# Patient Record
Sex: FEMALE | Race: WHITE | Employment: UNEMPLOYED | URBAN - METROPOLITAN AREA
[De-identification: names, ages, dates, MRNs, and addresses within clinical notes are randomized per-mention and may not be internally consistent; named-entity substitution may affect disease eponyms.]

---

## 2023-12-26 ENCOUNTER — HOSPITAL ENCOUNTER (OUTPATIENT)
Age: 33
Discharge: HOME | End: 2023-12-26
Payer: COMMERCIAL

## 2023-12-26 ENCOUNTER — HOSPITAL ENCOUNTER (OUTPATIENT)
Age: 33
Setting detail: SPECIMEN
Discharge: HOME | End: 2023-12-26

## 2023-12-26 ENCOUNTER — OFFICE VISIT (OUTPATIENT)
Dept: FAMILY MEDICINE CLINIC | Age: 33
End: 2023-12-26
Payer: COMMERCIAL

## 2023-12-26 VITALS
DIASTOLIC BLOOD PRESSURE: 90 MMHG | WEIGHT: 178.4 LBS | OXYGEN SATURATION: 98 % | HEART RATE: 83 BPM | BODY MASS INDEX: 25.54 KG/M2 | SYSTOLIC BLOOD PRESSURE: 129 MMHG | HEIGHT: 70 IN | TEMPERATURE: 98.4 F

## 2023-12-26 DIAGNOSIS — R21 RASH AND NONSPECIFIC SKIN ERUPTION: ICD-10-CM

## 2023-12-26 DIAGNOSIS — L50.9 URTICARIA: Primary | ICD-10-CM

## 2023-12-26 DIAGNOSIS — L50.9 URTICARIA: ICD-10-CM

## 2023-12-26 LAB
ANION GAP SERPL CALCULATED.3IONS-SCNC: 11 MMOL/L (ref 9–17)
BASOPHILS # BLD: 0 K/UL (ref 0–0.2)
BASOPHILS NFR BLD: 1 % (ref 0–2)
BUN SERPL-MCNC: 7 MG/DL (ref 6–20)
CALCIUM SERPL-MCNC: 8.8 MG/DL (ref 8.6–10.4)
CHLORIDE SERPL-SCNC: 102 MMOL/L (ref 98–107)
CO2 SERPL-SCNC: 27 MMOL/L (ref 20–31)
CREAT SERPL-MCNC: 0.6 MG/DL (ref 0.5–0.9)
EOSINOPHIL # BLD: 0.2 K/UL (ref 0–0.4)
EOSINOPHILS RELATIVE PERCENT: 3 % (ref 0–4)
ERYTHROCYTE [DISTWIDTH] IN BLOOD BY AUTOMATED COUNT: 12.6 % (ref 11.5–14.9)
GFR SERPL CREATININE-BSD FRML MDRD: >60 ML/MIN/1.73M2
GLUCOSE SERPL-MCNC: 109 MG/DL (ref 70–99)
HCT VFR BLD AUTO: 41.6 % (ref 36–46)
HGB BLD-MCNC: 14.6 G/DL (ref 12–16)
LYMPHOCYTES NFR BLD: 1.6 K/UL (ref 1–4.8)
LYMPHOCYTES RELATIVE PERCENT: 21 % (ref 24–44)
MCH RBC QN AUTO: 32 PG (ref 26–34)
MCHC RBC AUTO-ENTMCNC: 35.1 G/DL (ref 31–37)
MCV RBC AUTO: 91.3 FL (ref 80–100)
MONOCYTES NFR BLD: 1.1 K/UL (ref 0.1–1.3)
MONOCYTES NFR BLD: 15 % (ref 1–7)
NEUTROPHILS NFR BLD: 60 % (ref 36–66)
NEUTS SEG NFR BLD: 4.4 K/UL (ref 1.3–9.1)
PLATELET # BLD AUTO: 290 K/UL (ref 150–450)
PMV BLD AUTO: 7.5 FL (ref 6–12)
POTASSIUM SERPL-SCNC: 3.9 MMOL/L (ref 3.7–5.3)
RBC # BLD AUTO: 4.55 M/UL (ref 4–5.2)
SODIUM SERPL-SCNC: 140 MMOL/L (ref 135–144)
WBC OTHER # BLD: 7.3 K/UL (ref 3.5–11)

## 2023-12-26 PROCEDURE — 80048 BASIC METABOLIC PNL TOTAL CA: CPT

## 2023-12-26 PROCEDURE — 99203 OFFICE O/P NEW LOW 30 MIN: CPT

## 2023-12-26 PROCEDURE — 85025 COMPLETE CBC W/AUTO DIFF WBC: CPT

## 2023-12-26 PROCEDURE — 36415 COLL VENOUS BLD VENIPUNCTURE: CPT

## 2023-12-26 RX ORDER — METHYLPREDNISOLONE SODIUM SUCCINATE 125 MG/2ML
125 INJECTION, POWDER, LYOPHILIZED, FOR SOLUTION INTRAMUSCULAR; INTRAVENOUS ONCE
Status: COMPLETED | OUTPATIENT
Start: 2023-12-26 | End: 2023-12-26

## 2023-12-26 RX ORDER — PREDNISONE 20 MG/1
40 TABLET ORAL DAILY
COMMUNITY
Start: 2023-12-10

## 2023-12-26 RX ADMIN — METHYLPREDNISOLONE SODIUM SUCCINATE 125 MG: 125 INJECTION, POWDER, LYOPHILIZED, FOR SOLUTION INTRAMUSCULAR; INTRAVENOUS at 16:23

## 2023-12-26 NOTE — PROGRESS NOTES
appearance. HENT:      Head: Normocephalic and atraumatic. Right Ear: Tympanic membrane, ear canal and external ear normal.      Left Ear: Tympanic membrane, ear canal and external ear normal.      Nose: Nose normal.      Mouth/Throat:      Lips: Pink. Mouth: Mucous membranes are moist.      Pharynx: Oropharynx is clear. Uvula midline. Eyes:      Conjunctiva/sclera: Conjunctivae normal.      Pupils: Pupils are equal, round, and reactive to light. Cardiovascular:      Rate and Rhythm: Normal rate and regular rhythm. Pulses: Normal pulses. Heart sounds: Normal heart sounds. Pulmonary:      Effort: Pulmonary effort is normal.      Breath sounds: Normal breath sounds and air entry. Musculoskeletal:      Cervical back: Normal range of motion and neck supple. Skin:     General: Skin is warm and dry. Capillary Refill: Capillary refill takes less than 2 seconds. Findings: Rash present. Rash is urticarial.      Comments: Urticarial, blanchable, slightly raised rash noted throughout the torso, bilateral arms and legs and back. They are nonpruritic, nontender. Neurological:      General: No focal deficit present. Mental Status: She is alert and oriented to person, place, and time. Mental status is at baseline. GCS: GCS eye subscore is 4. GCS verbal subscore is 5. GCS motor subscore is 6. Psychiatric:         Mood and Affect: Mood normal.         Behavior: Behavior normal.         Plan:          1. Urticaria  -     CBC with Auto Differential; Future  -     Basic Metabolic Panel; Future  -     methylPREDNISolone sodium succ (SOLU-MEDROL) injection 125 mg; 125 mg, IntraMUSCular, ONCE, 1 dose, On Tue 12/26/23 at 1615  2. Rash and nonspecific skin eruption  -     Varicella Zoster/Herpes Simplex 1 & 2, Molecular;  Future  -     [address] [facility], [facility] Jena is otherwise well, nontoxic appearing.  -Solu medrol administered in office, she tolerated this well with no post

## 2023-12-27 LAB
HSV1 DNA SPEC QL NAA+PROBE: NEGATIVE
HSV2 DNA SPEC QL NAA+PROBE: NEGATIVE
SOURCE: NORMAL
SPECIMEN DESCRIPTION: NORMAL
VZV DNA SPEC QL NAA+PROBE: NEGATIVE
VZV DNA SPEC QL NAA+PROBE: NORMAL

## 2024-01-30 NOTE — PROGRESS NOTES
Tobacco Use    Smoking status: Never    Smokeless tobacco: Never   Substance Use Topics    Alcohol use: Not on file       Pertinent ROS:  Review of Systems  Skin: Denies any new changing, growing or bleeding lesions or rashes except as described in the HPI   Constitutional: Denies fevers, chills, and malaise.    PHYSICAL EXAM:   /88 (Site: Left Upper Arm, Position: Sitting, Cuff Size: Medium Adult)   Pulse 66   Temp 98.6 °F (37 °C)   Ht 1.778 m (5' 10\")   Wt 85.3 kg (188 lb)   LMP 01/23/2024 (Approximate)   SpO2 96%   BMI 26.98 kg/m²     The patient is generally well appearing, well nourished, alert and conversational. Affect is normal.    Cutaneous Exam:  Physical Exam  Focused exam of bilateral legs, bilateral arms, chest, abdomen, back, buttocks, face was performed    Facial covering was removed during examination.    Diagnoses/exam findings/medical history pertinent to this visit are listed below:    Assessment:   Diagnosis Orders   1. Guttate psoriasis             Plan:  Widespread coin-shaped pink scaly plaques with evidence of partial treatment with steroids, strong predilection for buttock and legs  Favor guttate psoriasis, possibly strep triggered  Ddx includes mycosis fungoides, secondary syphilis, other  - undiagnosed new problem with uncertain prognosis   - start triamcinolone 0.025% cream for the face  - start triamcinolone 0.1% cream for the body   - start clobetasol cream for severe areas  - labs ordered (ASO, syphilis, and biologic labs)  - Reviewed risks and benefits of narrowband UVB (see signed consent form). Risks including damage to eyes, cataracts, sunburn, photoaging, cutaneous malignancy, and HSV reactivation  Punch Biopsy: The procedure and its risks were explained including but not limited to pain, bleeding, infection, permanent scar, permanent pigment alteration and need for an additional procedure. Consent to proceed with the procedure was obtained from the patient or the

## 2024-01-31 ENCOUNTER — HOSPITAL ENCOUNTER (OUTPATIENT)
Age: 34
Setting detail: SPECIMEN
Discharge: HOME | End: 2024-01-31

## 2024-01-31 ENCOUNTER — OFFICE VISIT (OUTPATIENT)
Dept: DERMATOLOGY | Age: 34
End: 2024-01-31

## 2024-01-31 VITALS
OXYGEN SATURATION: 96 % | HEIGHT: 70 IN | SYSTOLIC BLOOD PRESSURE: 120 MMHG | BODY MASS INDEX: 26.92 KG/M2 | HEART RATE: 66 BPM | TEMPERATURE: 98.6 F | DIASTOLIC BLOOD PRESSURE: 88 MMHG | WEIGHT: 188 LBS

## 2024-01-31 DIAGNOSIS — Z79.899 HIGH RISK MEDICATION USE: ICD-10-CM

## 2024-01-31 DIAGNOSIS — L40.4 GUTTATE PSORIASIS: ICD-10-CM

## 2024-01-31 DIAGNOSIS — A49.1 STREPTOCOCCAL INFECTION: ICD-10-CM

## 2024-01-31 DIAGNOSIS — L30.8 OTHER SPECIFIED DERMATITIS: ICD-10-CM

## 2024-01-31 DIAGNOSIS — L40.4 GUTTATE PSORIASIS: Primary | ICD-10-CM

## 2024-01-31 DIAGNOSIS — D48.5 NEOPLASM OF UNCERTAIN BEHAVIOR OF SKIN: ICD-10-CM

## 2024-01-31 LAB
ALBUMIN SERPL-MCNC: 4.9 G/DL (ref 3.5–5.2)
ALBUMIN/GLOB SERPL: 2 {RATIO} (ref 1–2.5)
ALP SERPL-CCNC: 58 U/L (ref 35–104)
ALT SERPL-CCNC: 12 U/L (ref 5–33)
ANION GAP SERPL CALCULATED.3IONS-SCNC: 11 MMOL/L (ref 9–17)
AST SERPL-CCNC: 17 U/L
BASOPHILS # BLD: 0.07 K/UL (ref 0–0.2)
BASOPHILS NFR BLD: 1 % (ref 0–2)
BILIRUB SERPL-MCNC: 0.9 MG/DL (ref 0.3–1.2)
BUN SERPL-MCNC: 8 MG/DL (ref 6–20)
CALCIUM SERPL-MCNC: 9.5 MG/DL (ref 8.6–10.4)
CHLORIDE SERPL-SCNC: 102 MMOL/L (ref 98–107)
CO2 SERPL-SCNC: 28 MMOL/L (ref 20–31)
CREAT SERPL-MCNC: 0.6 MG/DL (ref 0.5–0.9)
EOSINOPHIL # BLD: 0.27 K/UL (ref 0–0.44)
EOSINOPHILS RELATIVE PERCENT: 4 % (ref 1–4)
ERYTHROCYTE [DISTWIDTH] IN BLOOD BY AUTOMATED COUNT: 11.4 % (ref 11.8–14.4)
GFR SERPL CREATININE-BSD FRML MDRD: >60 ML/MIN/1.73M2
GLUCOSE SERPL-MCNC: 77 MG/DL (ref 70–99)
HAV IGM SERPL QL IA: NONREACTIVE
HBV CORE IGM SERPL QL IA: NONREACTIVE
HBV SURFACE AG SERPL QL IA: NONREACTIVE
HCT VFR BLD AUTO: 43.2 % (ref 36.3–47.1)
HCV AB SERPL QL IA: NONREACTIVE
HGB BLD-MCNC: 15.2 G/DL (ref 11.9–15.1)
IMM GRANULOCYTES # BLD AUTO: 0.03 K/UL (ref 0–0.3)
IMM GRANULOCYTES NFR BLD: 1 %
LYMPHOCYTES NFR BLD: 2.26 K/UL (ref 1.1–3.7)
LYMPHOCYTES RELATIVE PERCENT: 35 % (ref 24–43)
MCH RBC QN AUTO: 31.5 PG (ref 25.2–33.5)
MCHC RBC AUTO-ENTMCNC: 35.2 G/DL (ref 28.4–34.8)
MCV RBC AUTO: 89.4 FL (ref 82.6–102.9)
MONOCYTES NFR BLD: 0.68 K/UL (ref 0.1–1.2)
MONOCYTES NFR BLD: 10 % (ref 3–12)
NEUTROPHILS NFR BLD: 49 % (ref 36–65)
NEUTS SEG NFR BLD: 3.22 K/UL (ref 1.5–8.1)
NRBC BLD-RTO: 0 PER 100 WBC
PLATELET # BLD AUTO: 326 K/UL (ref 138–453)
PMV BLD AUTO: 10.1 FL (ref 8.1–13.5)
POTASSIUM SERPL-SCNC: 3.6 MMOL/L (ref 3.7–5.3)
PROT SERPL-MCNC: 7.3 G/DL (ref 6.4–8.3)
RBC # BLD AUTO: 4.83 M/UL (ref 3.95–5.11)
SODIUM SERPL-SCNC: 141 MMOL/L (ref 135–144)
WBC OTHER # BLD: 6.5 K/UL (ref 3.5–11.3)

## 2024-01-31 RX ORDER — TRIAMCINOLONE ACETONIDE 1 MG/G
CREAM TOPICAL
Qty: 454 G | Refills: 1 | Status: SHIPPED | OUTPATIENT
Start: 2024-01-31

## 2024-01-31 RX ORDER — CLOBETASOL PROPIONATE 0.5 MG/G
CREAM TOPICAL
Qty: 60 G | Refills: 2 | Status: SHIPPED | OUTPATIENT
Start: 2024-01-31

## 2024-01-31 RX ORDER — CETIRIZINE HYDROCHLORIDE 10 MG/1
10 TABLET ORAL PRN
COMMUNITY

## 2024-01-31 RX ORDER — LIDOCAINE HYDROCHLORIDE AND EPINEPHRINE 10; 10 MG/ML; UG/ML
1 INJECTION, SOLUTION INFILTRATION; PERINEURAL ONCE
Status: SHIPPED | OUTPATIENT
Start: 2024-01-31

## 2024-01-31 RX ORDER — TRIAMCINOLONE ACETONIDE 0.25 MG/G
CREAM TOPICAL
Qty: 80 G | Refills: 2 | Status: SHIPPED | OUTPATIENT
Start: 2024-01-31

## 2024-01-31 NOTE — PATIENT INSTRUCTIONS

## 2024-02-01 LAB
ASO AB SERPL-ACNC: 715.8 IU/ML (ref 0–200)
HIV 1+2 AB+HIV1 P24 AG SERPL QL IA: NONREACTIVE
T PALLIDUM AB SER QL IA: NONREACTIVE

## 2024-02-01 RX ORDER — AMOXICILLIN 500 MG/1
500 CAPSULE ORAL 2 TIMES DAILY
Qty: 20 CAPSULE | Refills: 0 | Status: SHIPPED | OUTPATIENT
Start: 2024-02-01 | End: 2024-02-11

## 2024-02-01 RX ORDER — LIDOCAINE HYDROCHLORIDE AND EPINEPHRINE 10; 10 MG/ML; UG/ML
0.3 INJECTION, SOLUTION INFILTRATION; PERINEURAL ONCE
Status: SHIPPED | OUTPATIENT
Start: 2024-02-01

## 2024-02-02 ENCOUNTER — NURSE ONLY (OUTPATIENT)
Dept: LAB | Age: 34
End: 2024-02-02

## 2024-02-03 LAB
QUANTI TB GOLD PLUS: NEGATIVE
QUANTI TB1 MINUS NIL: 0 IU/ML (ref 0–0.34)
QUANTI TB2 MINUS NIL: 0 IU/ML (ref 0–0.34)
QUANTIFERON MITOGEN: 7.12 IU/ML
QUANTIFERON NIL: 0.04 IU/ML

## 2024-02-09 ENCOUNTER — NURSE ONLY (OUTPATIENT)
Dept: DERMATOLOGY | Age: 34
End: 2024-02-09

## 2024-02-09 ENCOUNTER — TELEPHONE (OUTPATIENT)
Dept: DERMATOLOGY | Age: 34
End: 2024-02-09

## 2024-02-09 VITALS — WEIGHT: 188 LBS | TEMPERATURE: 97.3 F | HEIGHT: 70 IN | BODY MASS INDEX: 26.92 KG/M2

## 2024-02-09 DIAGNOSIS — L40.4 GUTTATE PSORIASIS: Primary | ICD-10-CM

## 2024-02-09 DIAGNOSIS — Z48.02 ENCOUNTER FOR REMOVAL OF SUTURES: Primary | ICD-10-CM

## 2024-02-09 PROCEDURE — 99024 POSTOP FOLLOW-UP VISIT: CPT | Performed by: DERMATOLOGY

## 2024-02-09 NOTE — PROGRESS NOTES
Kenzie Zavala presented for suture removal on the right knee. The biopsy site was well healed. The suture was removed and a band-aid applied. The patient left in good condition.

## 2024-02-12 ENCOUNTER — PROCEDURE VISIT (OUTPATIENT)
Dept: DERMATOLOGY | Age: 34
End: 2024-02-12

## 2024-02-12 VITALS
TEMPERATURE: 97.3 F | DIASTOLIC BLOOD PRESSURE: 78 MMHG | OXYGEN SATURATION: 97 % | HEART RATE: 71 BPM | SYSTOLIC BLOOD PRESSURE: 115 MMHG | HEIGHT: 70 IN | BODY MASS INDEX: 26.92 KG/M2 | WEIGHT: 188 LBS

## 2024-02-12 DIAGNOSIS — L40.4 GUTTATE PSORIASIS: Primary | ICD-10-CM

## 2024-02-12 NOTE — PROGRESS NOTES
PHOTOTHERAPY NURSING PROGRESS NOTE    Patient presented today for narrowband UVB treatment.    Signed consent and physician phototherapy orders were reviewed and determined to be current and valid. Medications were reviewed and any changes were brought to the physician's attention and adjustments made to the treatment dose if necessary. If applicable, the patient was queried about erythema response from previous treatment and adjustments made to treatment dose per protocol.    The patient was provided with protective eye shields to be worn throughout the treatment. The patient undressed to undergarments, and additional areas of skin were shielded as directed by the physician's orders. Exposed skin was prepped with application of mineral oil with nurse's assistance. The patient received phototherapy treatment, dosed per protocol, without complication and left in good condition.    Today's treatment number, dose, duration, and assessment of erythema from prior treatment were recorded in the phototherapy flowsheet under today's date.    The patients next treatment date was scheduled/confirmed.        2/12/2024     2:00 PM   Phototherapy   Date of treatment 2/12/2024   Treatment Number 1   Dose (mJ/cm2) 220mj   Treatment Duration (seconds) 49   Erythema None   Comments Pt tolerated       I, Dr. Odom, was on site and available during this patient's phototherapy treatment. I agree with the documentation above.

## 2024-02-14 ENCOUNTER — PROCEDURE VISIT (OUTPATIENT)
Dept: DERMATOLOGY | Age: 34
End: 2024-02-14

## 2024-02-14 VITALS
TEMPERATURE: 97.7 F | WEIGHT: 188 LBS | DIASTOLIC BLOOD PRESSURE: 78 MMHG | BODY MASS INDEX: 26.92 KG/M2 | HEIGHT: 70 IN | OXYGEN SATURATION: 98 % | HEART RATE: 89 BPM | SYSTOLIC BLOOD PRESSURE: 116 MMHG

## 2024-02-14 DIAGNOSIS — L40.4 GUTTATE PSORIASIS: Primary | ICD-10-CM

## 2024-02-14 NOTE — PROGRESS NOTES
PHOTOTHERAPY NURSING PROGRESS NOTE    Patient presented today for narrowband UVB treatment.    Signed consent and physician phototherapy orders were reviewed and determined to be current and valid. Medications were reviewed and any changes were brought to the physician's attention and adjustments made to the treatment dose if necessary. If applicable, the patient was queried about erythema response from previous treatment and adjustments made to treatment dose per protocol.    The patient was provided with protective eye shields to be worn throughout the treatment. The patient undressed to undergarments, and additional areas of skin were shielded as directed by the physician's orders. Exposed skin was prepped with application of mineral oil with nurse's assistance. The patient received phototherapy treatment, dosed per protocol, without complication and left in good condition.    Today's treatment number, dose, duration, and assessment of erythema from prior treatment were recorded in the phototherapy flowsheet under today's date.    The patients next treatment date was scheduled/confirmed.        2/12/2024     2:00 PM 2/14/2024     2:00 PM   Phototherapy   Date of treatment 2/12/2024 2/14/2024   Treatment Number 1 2   Dose (mJ/cm2) 220mj 245mj   Treatment Duration (seconds) 49 54   Erythema None Minimal   Erythema Resolved  Yes   Comments Pt tolerated Pt tolerated       I, Dr. Odom, was on site and available during this patient's phototherapy treatment. I agree with the documentation above.

## 2024-02-16 ENCOUNTER — PROCEDURE VISIT (OUTPATIENT)
Dept: DERMATOLOGY | Age: 34
End: 2024-02-16

## 2024-02-16 VITALS
OXYGEN SATURATION: 98 % | BODY MASS INDEX: 26.92 KG/M2 | WEIGHT: 188 LBS | HEIGHT: 70 IN | TEMPERATURE: 97.3 F | DIASTOLIC BLOOD PRESSURE: 79 MMHG | SYSTOLIC BLOOD PRESSURE: 119 MMHG | HEART RATE: 83 BPM

## 2024-02-16 DIAGNOSIS — L40.4 GUTTATE PSORIASIS: Primary | ICD-10-CM

## 2024-02-16 NOTE — PROGRESS NOTES
PHOTOTHERAPY NURSING PROGRESS NOTE    Patient presented today for narrowband UVB treatment.    Signed consent and physician phototherapy orders were reviewed and determined to be current and valid. Medications were reviewed and any changes were brought to the physician's attention and adjustments made to the treatment dose if necessary. If applicable, the patient was queried about erythema response from previous treatment and adjustments made to treatment dose per protocol.    The patient was provided with protective eye shields to be worn throughout the treatment. The patient undressed to undergarments, and additional areas of skin were shielded as directed by the physician's orders. Exposed skin was prepped with application of mineral oil with nurse's assistance. The patient received phototherapy treatment, dosed per protocol, without complication and left in good condition.    Today's treatment number, dose, duration, and assessment of erythema from prior treatment were recorded in the phototherapy flowsheet under today's date.    The patients next treatment date was scheduled/confirmed.        2/12/2024     2:00 PM 2/14/2024     2:00 PM 2/16/2024     1:00 PM   Phototherapy   Date of treatment 2/12/2024 2/14/2024 2/16/2024   Treatment Number 1 2 3   Dose (mJ/cm2) 220mj 245mj 270mj   Treatment Duration (seconds) 49 54 59   Erythema None Minimal None   Erythema Resolved  Yes    Comments Pt tolerated Pt tolerated PT tolerated       I, Dominic Pulido PA-C, was on site and available during this patient's phototherapy treatment. I agree with the documentation above.

## 2024-02-19 ENCOUNTER — PROCEDURE VISIT (OUTPATIENT)
Dept: DERMATOLOGY | Age: 34
End: 2024-02-19
Payer: COMMERCIAL

## 2024-02-19 VITALS
WEIGHT: 188 LBS | HEART RATE: 74 BPM | BODY MASS INDEX: 26.92 KG/M2 | HEIGHT: 70 IN | SYSTOLIC BLOOD PRESSURE: 124 MMHG | DIASTOLIC BLOOD PRESSURE: 74 MMHG | OXYGEN SATURATION: 98 %

## 2024-02-19 DIAGNOSIS — L40.4 GUTTATE PSORIASIS: Primary | ICD-10-CM

## 2024-02-19 PROCEDURE — 96910 PHOTCHMTX TAR&UVB/PTRLTM&UVB: CPT | Performed by: DERMATOLOGY

## 2024-02-19 NOTE — PROGRESS NOTES
PHOTOTHERAPY NURSING PROGRESS NOTE    Patient presented today for narrowband UVB treatment.    Signed consent and physician phototherapy orders were reviewed and determined to be current and valid. Medications were reviewed and any changes were brought to the physician's attention and adjustments made to the treatment dose if necessary. If applicable, the patient was queried about erythema response from previous treatment and adjustments made to treatment dose per protocol.    The patient was provided with protective eye shields to be worn throughout the treatment. The patient undressed to undergarments, and additional areas of skin were shielded as directed by the physician's orders. Exposed skin was prepped with application of mineral oil with nurse's assistance. The patient received phototherapy treatment, dosed per protocol, without complication and left in good condition.    Today's treatment number, dose, duration, and assessment of erythema from prior treatment were recorded in the phototherapy flowsheet under today's date.    The patients next treatment date was scheduled/confirmed.        2/12/2024     2:00 PM 2/14/2024     2:00 PM 2/16/2024     1:00 PM 2/19/2024     2:00 PM   Phototherapy   Date of treatment 2/12/2024 2/14/2024 2/16/2024 2/19/2024   Treatment Number 1 2 3 4   Dose (mJ/cm2) 220mj 245mj 270mj 295mj   Treatment Duration (seconds) 49 54 59 1:06   Erythema None Minimal None None   Erythema Resolved  Yes     Comments Pt tolerated Pt tolerated PT tolerated PT tolerated       I, Dr. Odom, was on site and available during this patient's phototherapy treatment. I agree with the documentation above.

## 2024-02-21 ENCOUNTER — PROCEDURE VISIT (OUTPATIENT)
Dept: DERMATOLOGY | Age: 34
End: 2024-02-21
Payer: COMMERCIAL

## 2024-02-21 VITALS
SYSTOLIC BLOOD PRESSURE: 123 MMHG | DIASTOLIC BLOOD PRESSURE: 82 MMHG | TEMPERATURE: 98.2 F | BODY MASS INDEX: 26.92 KG/M2 | HEART RATE: 74 BPM | HEIGHT: 70 IN | WEIGHT: 188 LBS

## 2024-02-21 DIAGNOSIS — L40.4 GUTTATE PSORIASIS: Primary | ICD-10-CM

## 2024-02-21 PROCEDURE — 96910 PHOTCHMTX TAR&UVB/PTRLTM&UVB: CPT | Performed by: DERMATOLOGY

## 2024-02-21 RX ORDER — RISANKIZUMAB-RZAA 150 MG/ML
INJECTION SUBCUTANEOUS
Qty: 1 ML | Refills: 1 | Status: SHIPPED | OUTPATIENT
Start: 2024-02-21

## 2024-02-21 RX ORDER — RISANKIZUMAB-RZAA 150 MG/ML
INJECTION SUBCUTANEOUS
Qty: 2 ML | Refills: 0 | Status: SHIPPED | OUTPATIENT
Start: 2024-02-21

## 2024-02-21 NOTE — PROGRESS NOTES
PHOTOTHERAPY NURSING PROGRESS NOTE    Patient presented today for narrowband UVB treatment.    Signed consent and physician phototherapy orders were reviewed and determined to be current and valid. Medications were reviewed and any changes were brought to the physician's attention and adjustments made to the treatment dose if necessary. If applicable, the patient was queried about erythema response from previous treatment and adjustments made to treatment dose per protocol.    The patient was provided with protective eye shields to be worn throughout the treatment. The patient undressed to undergarments, and additional areas of skin were shielded as directed by the physician's orders. Exposed skin was prepped with application of mineral oil with nurse's assistance. The patient received phototherapy treatment, dosed per protocol, without complication and left in good condition.    Today's treatment number, dose, duration, and assessment of erythema from prior treatment were recorded in the phototherapy flowsheet under today's date.    The patients next treatment date was scheduled/confirmed.    Start PT on sk[place]nguy[place]n, PT stated she will be back from [place] in June and will schedule a F/U appt for June before she leaves. PT has an appt on 3/7/24.         2/12/2024     2:00 PM 2/14/2024     2:00 PM 2/16/2024     1:00 PM 2/19/2024     2:00 PM 2/21/2024     2:00 PM   Phototherapy   Date of treatment 2/12/2024 2/14/2024 2/16/2024 2/19/2024 2/21/2024   Treatment Number 1 2 3 4 5   Dose (mJ/cm2) 220mj 245mj 270mj 295mj 310mj   Treatment Duration (seconds) 49 54 59 1:06 1:10   Erythema None Minimal None None None   Erythema Resolved  Yes      Comments Pt tolerated Pt tolerated PT tolerated PT tolerated PT Tolerated       I, Dr. Odom, was on site and available during this patient's phototherapy treatment. I agree with the documentation above.

## 2024-02-22 ENCOUNTER — PROCEDURE VISIT (OUTPATIENT)
Dept: DERMATOLOGY | Age: 34
End: 2024-02-22
Payer: COMMERCIAL

## 2024-02-22 VITALS
BODY MASS INDEX: 26.92 KG/M2 | HEART RATE: 62 BPM | WEIGHT: 188 LBS | DIASTOLIC BLOOD PRESSURE: 76 MMHG | TEMPERATURE: 97.7 F | OXYGEN SATURATION: 98 % | HEIGHT: 70 IN | SYSTOLIC BLOOD PRESSURE: 117 MMHG

## 2024-02-22 DIAGNOSIS — L40.4 GUTTATE PSORIASIS: Primary | ICD-10-CM

## 2024-02-22 PROCEDURE — 96910 PHOTCHMTX TAR&UVB/PTRLTM&UVB: CPT | Performed by: DERMATOLOGY

## 2024-02-22 NOTE — PROGRESS NOTES
PHOTOTHERAPY NURSING PROGRESS NOTE    Patient presented today for narrowband UVB treatment.    Signed consent and physician phototherapy orders were reviewed and determined to be current and valid. Medications were reviewed and any changes were brought to the physician's attention and adjustments made to the treatment dose if necessary. If applicable, the patient was queried about erythema response from previous treatment and adjustments made to treatment dose per protocol.    The patient was provided with protective eye shields to be worn throughout the treatment. The patient undressed to undergarments, and additional areas of skin were shielded as directed by the physician's orders. Exposed skin was prepped with application of mineral oil with nurse's assistance. The patient received phototherapy treatment, dosed per protocol, without complication and left in good condition.    Today's treatment number, dose, duration, and assessment of erythema from prior treatment were recorded in the phototherapy flowsheet under today's date.    The patients next treatment date was scheduled/confirmed.        2/12/2024     2:00 PM 2/14/2024     2:00 PM 2/16/2024     1:00 PM 2/19/2024     2:00 PM 2/21/2024     2:00 PM 2/22/2024     2:00 PM   Phototherapy   Date of treatment 2/12/2024 2/14/2024 2/16/2024 2/19/2024 2/21/2024 2/22/2024   Treatment Number 1 2 3 4 5 6   Dose (mJ/cm2) 220mj 245mj 270mj 295mj 310mj 345mj   Treatment Duration (seconds) 49 54 59 1:06 1:10 1:15   Erythema None Minimal None None None Minimal   Erythema Resolved  Yes    Yes   Comments Pt tolerated Pt tolerated PT tolerated PT tolerated PT Tolerated PT tolerated       I, Dr. Odom, was on site and available during this patient's phototherapy treatment. I agree with the documentation above.

## 2024-02-26 ENCOUNTER — TELEPHONE (OUTPATIENT)
Dept: DERMATOLOGY | Age: 34
End: 2024-02-26

## 2024-02-26 NOTE — TELEPHONE ENCOUNTER
----- Message from Seabstian Wilson sent at 2/23/2024 10:41 AM EST -----  Regarding: mir Dove requires history of at least 90 days of two preferred medications.  Has the patient tried any other topicals?        Sebastian

## 2024-02-28 NOTE — TELEPHONE ENCOUNTER
Please inform patient that we won't be able to get Skyrizi approved until she has tried 90 days of the current therapy (topicals and phototherapy). She may not need Skyrizi, but unfortunately we will find out if she needs it when she's in [place]. This may be a good use for samples. Can we contact Norma rep please?

## 2024-02-28 NOTE — TELEPHONE ENCOUNTER
\"I attempted to resend PA for DREW ADAMSON 1990, with documentation that patient is going out of [facility], but because her therapy of topicals and phototherapy have not been 90 days Medicaid will NOT approve Norma.     We can attempt appeal (which is think is required before we procced with [employer])\"

## 2024-02-29 NOTE — PROGRESS NOTES
groin) 454 g 1    clobetasol (TEMOVATE) 0.05 % cream Apply to severe rash twice daily, avoiding face and body folds 60 g 2    predniSONE (DELTASONE) 20 MG tablet Take 2 tablets by mouth daily for 5 days (Patient not taking: Reported on 12/26/2023)       Current Facility-Administered Medications   Medication Dose Route Frequency Provider Last Rate Last Admin    lidocaine-EPINEPHrine 1 %-1:[id number] injection 0.3 mL  0.3 mL IntraDERmal Once Emma Odom MD        lidocaine-EPINEPHrine 1 %-1:756349 injection 1 mL  1 mL IntraDERmal Once HeurEmma shultz MD           ALLERGIES:   No Known Allergies    SOCIAL HISTORY:  Social History     Tobacco Use    Smoking status: Never    Smokeless tobacco: Never   Substance Use Topics    Alcohol use: Not on file       Pertinent ROS:  Review of Systems  Skin: Denies any new changing, growing or bleeding lesions or rashes except as described in the HPI   Constitutional: Denies fevers, chills, and malaise.    PHYSICAL EXAM:   /71   Pulse 81   Temp 97.3 °F (36.3 °C)   Ht 1.778 m (5' 10\")   Wt 85.3 kg (188 lb)   SpO2 98%   BMI 26.98 kg/m²     The patient is generally well appearing, well nourished, alert and conversational. Affect is normal.    Cutaneous Exam:  Physical Exam  Focused exam of bilateral arms, bilateral legs was performed    Facial covering was removed during examination.    Diagnoses/exam findings/medical history pertinent to this visit are listed below:    Assessment:   Diagnosis Orders   1. Guttate psoriasis        2. Post-inflammatory hyperpigmentation             Plan:  Guttate psoriasis   Post-inflammatory hyperpigmentation   - stable chronic illness - previously widespread and now healing with PIH only s/p topicals and phototherapy  - discussed with patient approximately 2/3 of patients with guttate psoriasis will clear (typically within 6 months) and not develop chronic plaque psoriasis. The other third will develop chronic plaque psoriasis  - it is

## 2024-03-04 ENCOUNTER — PROCEDURE VISIT (OUTPATIENT)
Dept: DERMATOLOGY | Age: 34
End: 2024-03-04
Payer: COMMERCIAL

## 2024-03-04 VITALS
OXYGEN SATURATION: 97 % | HEART RATE: 71 BPM | TEMPERATURE: 97.6 F | DIASTOLIC BLOOD PRESSURE: 70 MMHG | BODY MASS INDEX: 26.92 KG/M2 | WEIGHT: 188 LBS | SYSTOLIC BLOOD PRESSURE: 111 MMHG | HEIGHT: 70 IN

## 2024-03-04 DIAGNOSIS — L40.4 GUTTATE PSORIASIS: Primary | ICD-10-CM

## 2024-03-04 PROCEDURE — 96910 PHOTCHMTX TAR&UVB/PTRLTM&UVB: CPT | Performed by: DERMATOLOGY

## 2024-03-04 NOTE — PROGRESS NOTES
PHOTOTHERAPY NURSING PROGRESS NOTE    Patient presented today for narrowband UVB treatment.    Signed consent and physician phototherapy orders were reviewed and determined to be current and valid. Medications were reviewed and any changes were brought to the physician's attention and adjustments made to the treatment dose if necessary. If applicable, the patient was queried about erythema response from previous treatment and adjustments made to treatment dose per protocol.    The patient was provided with protective eye shields to be worn throughout the treatment. The patient undressed to undergarments, and additional areas of skin were shielded as directed by the physician's orders. Exposed skin was prepped with application of mineral oil with nurse's assistance. The patient received phototherapy treatment, dosed per protocol, without complication and left in good condition.    Today's treatment number, dose, duration, and assessment of erythema from prior treatment were recorded in the phototherapy flowsheet under today's date.    The patients next treatment date was scheduled/confirmed.        2/12/2024     2:00 PM 2/14/2024     2:00 PM 2/16/2024     1:00 PM 2/19/2024     2:00 PM 2/21/2024     2:00 PM 2/22/2024     2:00 PM 3/4/2024     2:00 PM   Phototherapy   Date of treatment 2/12/2024 2/14/2024 2/16/2024 2/19/2024 2/21/2024 2/22/2024 3/4/2024   Treatment Number 1 2 3 4 5 6 7   Dose (mJ/cm2) 220mj 245mj 270mj 295mj 310mj 345mj 345mj   Treatment Duration (seconds) 49 54 59 1:06 1:10 1:15 1:15   Erythema None Minimal None None None Minimal None   Erythema Resolved  Yes    Yes    Comments Pt tolerated Pt tolerated PT tolerated PT tolerated PT Tolerated PT tolerated Pt tolerated       I, Dr. Odom, was on site and available during this patient's phototherapy treatment. I agree with the documentation above.

## 2024-03-07 ENCOUNTER — PROCEDURE VISIT (OUTPATIENT)
Dept: DERMATOLOGY | Age: 34
End: 2024-03-07

## 2024-03-07 ENCOUNTER — OFFICE VISIT (OUTPATIENT)
Dept: DERMATOLOGY | Age: 34
End: 2024-03-07
Payer: COMMERCIAL

## 2024-03-07 VITALS
OXYGEN SATURATION: 98 % | HEART RATE: 81 BPM | DIASTOLIC BLOOD PRESSURE: 71 MMHG | WEIGHT: 188 LBS | BODY MASS INDEX: 26.92 KG/M2 | TEMPERATURE: 97.3 F | SYSTOLIC BLOOD PRESSURE: 127 MMHG | HEIGHT: 70 IN

## 2024-03-07 VITALS
BODY MASS INDEX: 26.92 KG/M2 | HEIGHT: 70 IN | OXYGEN SATURATION: 98 % | HEART RATE: 81 BPM | DIASTOLIC BLOOD PRESSURE: 71 MMHG | TEMPERATURE: 97.3 F | WEIGHT: 188 LBS | SYSTOLIC BLOOD PRESSURE: 127 MMHG

## 2024-03-07 DIAGNOSIS — L40.4 GUTTATE PSORIASIS: Primary | ICD-10-CM

## 2024-03-07 DIAGNOSIS — L81.0 POST-INFLAMMATORY HYPERPIGMENTATION: ICD-10-CM

## 2024-03-07 PROCEDURE — 99213 OFFICE O/P EST LOW 20 MIN: CPT | Performed by: DERMATOLOGY

## 2024-03-07 NOTE — PROGRESS NOTES
PHOTOTHERAPY NURSING PROGRESS NOTE    Patient presented today for narrowband UVB treatment.    Signed consent and physician phototherapy orders were reviewed and determined to be current and valid. Medications were reviewed and any changes were brought to the physician's attention and adjustments made to the treatment dose if necessary. If applicable, the patient was queried about erythema response from previous treatment and adjustments made to treatment dose per protocol.    The patient was provided with protective eye shields to be worn throughout the treatment. The patient undressed to undergarments, and additional areas of skin were shielded as directed by the physician's orders. Exposed skin was prepped with application of mineral oil with nurse's assistance. The patient received phototherapy treatment, dosed per protocol, without complication and left in good condition.    Today's treatment number, dose, duration, and assessment of erythema from prior treatment were recorded in the phototherapy flowsheet under today's date.    The patients next treatment date was scheduled/confirmed.        2/14/2024     2:00 PM 2/16/2024     1:00 PM 2/19/2024     2:00 PM 2/21/2024     2:00 PM 2/22/2024     2:00 PM 3/4/2024     2:00 PM 3/7/2024    10:00 AM   Phototherapy   Date of treatment 2/14/2024 2/16/2024 2/19/2024 2/21/2024 2/22/2024 3/4/2024 3/7/2024   Treatment Number 2 3 4 5 6 7 8   Dose (mJ/cm2) 245mj 270mj 295mj 310mj 345mj 345mj 370mj   Treatment Duration (seconds) 54 59 1:06 1:10 1:15 1:15 1:21   Erythema Minimal None None None Minimal None None   Erythema Resolved Yes    Yes     Comments Pt tolerated PT tolerated PT tolerated PT Tolerated PT tolerated Pt tolerated Pt tolerated       I, Dr. Odom, was on site and available during this patient's phototherapy treatment. I agree with the documentation above.

## 2024-03-08 ENCOUNTER — PROCEDURE VISIT (OUTPATIENT)
Dept: DERMATOLOGY | Age: 34
End: 2024-03-08
Payer: COMMERCIAL

## 2024-03-08 ENCOUNTER — TELEPHONE (OUTPATIENT)
Dept: DERMATOLOGY | Age: 34
End: 2024-03-08

## 2024-03-08 VITALS
WEIGHT: 188 LBS | BODY MASS INDEX: 26.92 KG/M2 | DIASTOLIC BLOOD PRESSURE: 80 MMHG | HEART RATE: 80 BPM | TEMPERATURE: 97.5 F | HEIGHT: 70 IN | OXYGEN SATURATION: 98 % | SYSTOLIC BLOOD PRESSURE: 119 MMHG

## 2024-03-08 DIAGNOSIS — L40.4 GUTTATE PSORIASIS: Primary | ICD-10-CM

## 2024-03-08 PROCEDURE — 96910 PHOTCHMTX TAR&UVB/PTRLTM&UVB: CPT | Performed by: DERMATOLOGY

## 2024-03-08 NOTE — TELEPHONE ENCOUNTER
On 3/8/2024 Per Dr. Odom PT was given 1 skyrizi pen LOT # [id number] Exp: 9/2024. PT given instruction to wash hand with soap and water, dry hands, using a alcohol pad clean the area that you are going to inject, making sure the injection site is an area that does not have a mole, tattoo, scar, and/or psoriasis. If injecting in abdomen inject 2 in from umbilicus. Pt verbalized understanding.

## 2024-03-08 NOTE — PROGRESS NOTES
PHOTOTHERAPY NURSING PROGRESS NOTE    Patient presented today for narrowband UVB treatment.    Signed consent and physician phototherapy orders were reviewed and determined to be current and valid. Medications were reviewed and any changes were brought to the physician's attention and adjustments made to the treatment dose if necessary. If applicable, the patient was queried about erythema response from previous treatment and adjustments made to treatment dose per protocol.    The patient was provided with protective eye shields to be worn throughout the treatment. The patient undressed to undergarments, and additional areas of skin were shielded as directed by the physician's orders. Exposed skin was prepped with application of mineral oil with nurse's assistance. The patient received phototherapy treatment, dosed per protocol, without complication and left in good condition.    Today's treatment number, dose, duration, and assessment of erythema from prior treatment were recorded in the phototherapy flowsheet under today's date.    The patients next treatment date was scheduled/confirmed.        2/16/2024     1:00 PM 2/19/2024     2:00 PM 2/21/2024     2:00 PM 2/22/2024     2:00 PM 3/4/2024     2:00 PM 3/7/2024    10:00 AM 3/8/2024     1:00 PM   Phototherapy   Date of treatment 2/16/2024 2/19/2024 2/21/2024 2/22/2024 3/4/2024 3/7/2024 3/8/2024   Treatment Number 3 4 5 6 7 8 9   Dose (mJ/cm2) 270mj 295mj 310mj 345mj 345mj 370mj 395mj   Treatment Duration (seconds) 59 1:06 1:10 1:15 1:15 1:21 1:27   Erythema None None None Minimal None None None   Erythema Resolved    Yes      Comments PT tolerated PT tolerated PT Tolerated PT tolerated Pt tolerated Pt tolerated Pt tolerated       IDominic PA-C, was on site and available during this patient's phototherapy treatment. I agree with the documentation above.

## 2024-03-10 RX ORDER — RISANKIZUMAB-RZAA 150 MG/ML
INJECTION SUBCUTANEOUS
Qty: 2 ML | Refills: 0 | Status: SHIPPED | OUTPATIENT
Start: 2024-03-10

## 2024-03-20 ENCOUNTER — TELEPHONE (OUTPATIENT)
Dept: DERMATOLOGY | Age: 34
End: 2024-03-20

## 2024-03-20 NOTE — TELEPHONE ENCOUNTER
----- Message from Estrella Qureshi MA sent at 3/19/2024 10:23 AM EDT -----  Regarding: FW: mir moran  Kenzie Zavala  1990  [id number]     These are the only medication coming up for me which is what I submitted to insurance,  I am not able to get the Skyrizi approved since she has not been on topicals for more than 90 days . Please let me know if I am missing something else.     Thanks,       ----- Message -----  From: Sebastian Wilson  Sent: 3/19/2024   8:13 AM EDT  To: Estrella Qureshi MA; Liliya Steel; #  Subject: RE: mir moran                               Sent email with screen shot      ----- Message -----  From: Estrella Qureshi MA  Sent: 3/14/2024  12:13 PM EDT  To: Sebastian Wilson; Liliya Steel; #  Subject: RE: mir moran                               In chart review: medications-deselect current meds only and you will see all topicals and dates prescribed.    ----- Message -----  From: Sebastian Wilson  Sent: 2/28/2024   1:38 PM EDT  To: Estrella Qureshi MA; Liliya Steel; #  Subject: RE: mir moran                               Do you have dates for those?? (Must be >90 days)      ----- Message -----  From: Estrella Qureshi MA  Sent: 2/28/2024  12:13 PM EST  To: Sebastian Wilson; Liliya Steel; #  Subject: RE: mir moran                               According to her progress note, just the oral steroid and topical triamcinolone.  ----- Message -----  From: Sebastian Wilson  Sent: 2/23/2024  10:42 AM EST  To: Estrella Qureshi MA; Liliya Steel; #  Subject: skyrizi denial                                   [facility] requires history of at least 90 days of two preferred medications.  Has the patient tried any other topicals?        Sebastian

## 2024-05-08 NOTE — PROGRESS NOTES
sun exposure.  They should be reapplied every 2 hours and after any water exposure.    Sunscreen is not perfect.  It is important to use other methods to protect the skin from sun exposure also.  Wear hats, sunglasses and other sun protective clothing when outdoors.  Stay in the shade during the peak hours of sun exposure between 10 AM and 4 PM.        I, Precious Hutchinson, personally scribed the services dictated to me by Dr. Odom in this documentation.     I, Dr. Odom, personally performed the services described in this documentation, as scribed by Precious Hutchinson in my presence, and it is both accurate and complete.

## 2024-05-13 ENCOUNTER — OFFICE VISIT (OUTPATIENT)
Dept: DERMATOLOGY | Age: 34
End: 2024-05-13

## 2024-05-13 VITALS
HEIGHT: 70 IN | OXYGEN SATURATION: 99 % | BODY MASS INDEX: 26.34 KG/M2 | SYSTOLIC BLOOD PRESSURE: 119 MMHG | DIASTOLIC BLOOD PRESSURE: 82 MMHG | HEART RATE: 68 BPM | WEIGHT: 184 LBS | TEMPERATURE: 98.4 F

## 2024-05-13 DIAGNOSIS — L81.0 POST-INFLAMMATORY HYPERPIGMENTATION: ICD-10-CM

## 2024-05-13 DIAGNOSIS — L40.4 GUTTATE PSORIASIS: Primary | ICD-10-CM

## 2024-05-13 DIAGNOSIS — H01.135 ECZEMATOUS DERMATITIS OF LEFT LOWER EYELID: ICD-10-CM

## 2024-05-13 DIAGNOSIS — D22.9 IRRITATED NEVUS: ICD-10-CM

## 2024-05-13 RX ORDER — TACROLIMUS 1 MG/G
OINTMENT TOPICAL
Qty: 30 G | Refills: 2 | Status: SHIPPED | OUTPATIENT
Start: 2024-05-13

## 2024-05-13 RX ORDER — LIDOCAINE HYDROCHLORIDE AND EPINEPHRINE 10; 10 MG/ML; UG/ML
0.3 INJECTION, SOLUTION INFILTRATION; PERINEURAL ONCE
Status: SHIPPED | OUTPATIENT
Start: 2024-05-13

## 2024-05-13 NOTE — PATIENT INSTRUCTIONS
Tacrolimus 0.1% prescribed for use on the left lower eyelid    Sun Protection     There are two types of sun rays that are harmful to the skin.  UVA rays cause skin aging and skin cancer, such as melanoma.  UVB rays cause sunburns, cataracts, and also contribute to skin cancer.    The [employer] recommends that children and adults wear a broad spectrum, waterproof sunscreen with a Sun Protection Factor (SPF) of 30 or higher.  It is important to check the ingredient label to be sure the sunscreen will protect the skin from both UVA and UVB sunrays.  Your sunscreen should contain at least one of the following ingredients: titanium dioxide, zinc oxide, or avobenzone.    Sunscreen will not be effective unless it is applied to all exposed skin.  Sunscreens work best if they are applied 30 minutes before sun exposure.  They should be reapplied every 2 hours and after any water exposure.    Sunscreen is not perfect.  It is important to use other methods to protect the skin from sun exposure also.  Wear hats, sunglasses and other sun protective clothing when outdoors.  Stay in the shade during the peak hours of sun exposure between 10 AM and 4 PM.

## 2024-05-14 ENCOUNTER — NURSE ONLY (OUTPATIENT)
Dept: LAB | Age: 34
End: 2024-05-14

## 2024-05-20 NOTE — RESULT ENCOUNTER NOTE
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 No further treatment of this lesion is needed at this time.